# Patient Record
Sex: MALE | Race: WHITE | NOT HISPANIC OR LATINO | Employment: FULL TIME | URBAN - METROPOLITAN AREA
[De-identification: names, ages, dates, MRNs, and addresses within clinical notes are randomized per-mention and may not be internally consistent; named-entity substitution may affect disease eponyms.]

---

## 2024-03-08 ENCOUNTER — HOSPITAL ENCOUNTER (EMERGENCY)
Facility: OTHER | Age: 19
Discharge: HOME | End: 2024-03-08
Attending: EMERGENCY MEDICINE
Payer: COMMERCIAL

## 2024-03-08 VITALS
HEIGHT: 76 IN | TEMPERATURE: 98 F | WEIGHT: 190 LBS | DIASTOLIC BLOOD PRESSURE: 86 MMHG | SYSTOLIC BLOOD PRESSURE: 136 MMHG | RESPIRATION RATE: 18 BRPM | HEART RATE: 70 BPM | BODY MASS INDEX: 23.14 KG/M2 | OXYGEN SATURATION: 100 %

## 2024-03-08 DIAGNOSIS — T78.40XA ALLERGIC REACTION, INITIAL ENCOUNTER: Primary | ICD-10-CM

## 2024-03-08 DIAGNOSIS — R07.0 PAIN IN THROAT: ICD-10-CM

## 2024-03-08 DIAGNOSIS — T78.1XXA ALLERGIC REACTION TO FOOD, INITIAL ENCOUNTER: ICD-10-CM

## 2024-03-08 DIAGNOSIS — R13.10 DYSPHAGIA, UNSPECIFIED TYPE: ICD-10-CM

## 2024-03-08 PROCEDURE — 99284 EMERGENCY DEPT VISIT MOD MDM: CPT

## 2024-03-08 PROCEDURE — 25000003 PHARM REV CODE 250: Performed by: EMERGENCY MEDICINE

## 2024-03-08 PROCEDURE — 63600175 PHARM REV CODE 636 W HCPCS: Performed by: EMERGENCY MEDICINE

## 2024-03-08 RX ORDER — FAMOTIDINE 20 MG/1
20 TABLET, FILM COATED ORAL
Status: COMPLETED | OUTPATIENT
Start: 2024-03-08 | End: 2024-03-08

## 2024-03-08 RX ORDER — SODIUM CHLORIDE 9 MG/ML
1000 INJECTION, SOLUTION INTRAVENOUS
Status: DISCONTINUED | OUTPATIENT
Start: 2024-03-08 | End: 2024-03-08

## 2024-03-08 RX ORDER — FAMOTIDINE 10 MG/ML
20 INJECTION INTRAVENOUS
Status: DISCONTINUED | OUTPATIENT
Start: 2024-03-08 | End: 2024-03-08

## 2024-03-08 RX ORDER — PREDNISOLONE SODIUM PHOSPHATE 15 MG/5ML
60 SOLUTION ORAL
Status: COMPLETED | OUTPATIENT
Start: 2024-03-08 | End: 2024-03-08

## 2024-03-08 RX ORDER — PREDNISOLONE SODIUM PHOSPHATE 15 MG/5ML
60 SOLUTION ORAL DAILY
Qty: 60 ML | Refills: 0 | Status: SHIPPED | OUTPATIENT
Start: 2024-03-08 | End: 2024-03-11

## 2024-03-08 RX ORDER — EPINEPHRINE 0.3 MG/.3ML
1 INJECTION SUBCUTANEOUS
Qty: 2 EACH | Refills: 1 | Status: SHIPPED | OUTPATIENT
Start: 2024-03-08 | End: 2025-03-08

## 2024-03-08 RX ORDER — DIPHENHYDRAMINE HCL 25 MG
50 CAPSULE ORAL EVERY 4 HOURS PRN
Qty: 20 CAPSULE | Refills: 0 | Status: SHIPPED | OUTPATIENT
Start: 2024-03-08

## 2024-03-08 RX ORDER — METHYLPREDNISOLONE SOD SUCC 125 MG
125 VIAL (EA) INJECTION
Status: DISCONTINUED | OUTPATIENT
Start: 2024-03-08 | End: 2024-03-08

## 2024-03-08 RX ORDER — FAMOTIDINE 20 MG/1
20 TABLET, FILM COATED ORAL 2 TIMES DAILY
Qty: 6 TABLET | Refills: 0 | Status: SHIPPED | OUTPATIENT
Start: 2024-03-08 | End: 2024-03-11

## 2024-03-08 RX ADMIN — FAMOTIDINE 20 MG: 20 TABLET, FILM COATED ORAL at 09:03

## 2024-03-08 RX ADMIN — PREDNISOLONE SODIUM PHOSPHATE 60 MG: 15 SOLUTION ORAL at 09:03

## 2024-03-09 NOTE — FIRST PROVIDER EVALUATION
Emergency Department TeleTriage Encounter Note      CHIEF COMPLAINT    Chief Complaint   Patient presents with    Allergic Reaction     States having allergic reaction to ingesting seafood, took Benadryl 50 mg prior to arrival, no visible signs present       VITAL SIGNS   Initial Vitals [03/08/24 2049]   BP Pulse Resp Temp SpO2   (!) 141/75 72 16 98.2 °F (36.8 °C) 100 %      MAP       --            ALLERGIES    Review of patient's allergies indicates:   Allergen Reactions    Shellfish containing products Rash       PROVIDER TRIAGE NOTE  Verbal consent for the teletriage evaluation was given by the patient at the start of the evaluation.  All efforts will be made to maintain patient's privacy during the evaluation.      This is a teletriage evaluation of a 18 y.o. male presenting to the ED with c/o allergic reaction after eating seafood at 7:50 pm.  Took 50 mg Benadryl PTA. States it feels like it is hard to swallow but improving after taking benadryl.  No obvious distress noted in TT.  Limited physical exam via telehealth: The patient is awake, alert, answering questions appropriately and is not in respiratory distress.  As the Teletriage provider, I performed an initial assessment and ordered appropriate labs and imaging studies, if any, to facilitate the patient's care once placed in the ED. Once a room is available, care and a full evaluation will be completed by an alternate ED provider.  Any additional orders and the final disposition will be determined by that provider.  All imaging and labs will not be followed-up by the Teletriage Team, including myself.      Message sent to triage RN to have patient seen by in-person provider    ORDERS  Labs Reviewed   HIV 1 / 2 ANTIBODY   HEPATITIS C ANTIBODY       ED Orders (720h ago, onward)      Start Ordered     Status Ordering Provider    03/08/24 2100 03/08/24 2055  famotidine (PF) injection 20 mg  ED 1 Time         Ordered KADEN PETE    03/08/24 2100 03/08/24  2055  0.9%  NaCl infusion  ED 1 Time         Ordered KADEN PETE    03/08/24 2100 03/08/24 2055  methylPREDNISolone sodium succinate injection 125 mg  ED 1 Time         Ordered KADEN PETE    03/08/24 2056 03/08/24 2055  Saline lock IV  Once         Ordered KADEN PETE    03/08/24 2051 03/08/24 2050  HIV 1/2 Ag/Ab (4th Gen)  STAT         Ordered RONEY ALLEN II    03/08/24 2051 03/08/24 2050  Hepatitis C Antibody  STAT         Ordered RONEY ALLEN II              Virtual Visit Note: The provider triage portion of this emergency department evaluation and documentation was performed via [employer], a HIPAA-compliant telemedicine application, in concert with a tele-presenter in the room. A face to face patient evaluation with one of my colleagues will occur once the patient is placed in an emergency department room.      DISCLAIMER: This note was prepared with [employer] voice recognition transcription software. Garbled syntax, mangled pronouns, and other bizarre constructions may be attributed to that software system.

## 2024-03-09 NOTE — ED TRIAGE NOTES
Known allergy to shellfish. States he was at a wedding party about 45 min PTA and inadvertently at some food containing shellfish. Took Benadryl right away but came here to be checked. States he has experienced symptoms of throat closing and tightness in chest in the past. No complaints at this time. Presents awake, alert.

## 2024-03-09 NOTE — ED PROVIDER NOTES
Encounter Date: 3/8/2024       History     Chief Complaint   Patient presents with    Allergic Reaction     States having allergic reaction to ingesting seafood, took Benadryl 50 mg prior to arrival, no visible signs present     18-year-old male presents via personal transportation to [facility] ER with reported allergic reaction.  Patient states he was in his usual state of health earlier today.  Around 7:45 p.m., he ate a piece of food at a mixer; patient suspects the food contained shellfish to which he is allergic.  He then began having itching and burning sensation inside his mouth and throat, as well as sensation of foreign body in his throat.  This was associated with increased trouble swallowing.  Patient felt nauseated but did not vomit.  No voice changes.  No rash.  No shortness of breath.  Patient took OTC benadryl 50mg around 8:00 p.m.  Patient states pain in throat was worse than with prior reactions but otherwise reaction was similar to past.  Patient has been rx'ed EpiPen in past but has never filled these scripts due to cost.     Patient lives in [place] and is in [place] for the weekend.    Patient has a history of dysphagia ongoing for the last 2 years.  He reports seeing an ENT and having a scope which was normal.  Patient has trouble swallowing pills at baseline.      Review of patient's allergies indicates:   Allergen Reactions    Guaifenesin     Shellfish containing products Rash     No past medical history on file.  No past surgical history on file.  No family history on file.     Review of Systems   Constitutional:  Negative for fever.   HENT:  Positive for sore throat and trouble swallowing.    Eyes:  Negative for photophobia.   Respiratory:  Negative for shortness of breath and wheezing.    Cardiovascular:  Negative for chest pain.   Gastrointestinal:  Positive for nausea. Negative for abdominal pain and vomiting.   Genitourinary:  Negative for dysuria.   Musculoskeletal:  Negative  for gait problem.   Skin:  Negative for rash.   Allergic/Immunologic: Positive for food allergies.   Neurological:  Negative for syncope.       Physical Exam     Initial Vitals [03/08/24 2049]   BP Pulse Resp Temp SpO2   (!) 141/75 72 16 98.2 °F (36.8 °C) 100 %      MAP       --         Physical Exam    Nursing note and vitals reviewed.  Constitutional: He appears well-developed and well-nourished. He is not diaphoretic.   Awake, alert, nontoxic, speaking in complete sentences without slurred or muffled speech. Handling secretions.   HENT:   Head: Normocephalic and atraumatic.   Mouth/Throat: Oropharynx is clear and moist.   Eyes: Conjunctivae and EOM are normal. Pupils are equal, round, and reactive to light.   Neck: Neck supple.   Normal range of motion.  Cardiovascular:  Normal rate, regular rhythm and intact distal pulses.           Pulmonary/Chest: Breath sounds normal. No respiratory distress. He has no wheezes. He has no rhonchi. He has no rales.   Abdominal: Abdomen is soft. There is no abdominal tenderness.   Musculoskeletal:      Cervical back: Normal range of motion and neck supple.      Comments: Ambulatory     Neurological: He is alert and oriented to person, place, and time.   Moving all extremities   Skin: Skin is warm and dry.   Psychiatric: His behavior is normal.         ED Course   Procedures  Labs Reviewed - No data to display       Imaging Results    None          Medications   prednisoLONE 15 mg/5 mL (3 mg/mL) solution 60 mg (60 mg Oral Given 3/8/24 2137)   famotidine tablet 20 mg (20 mg Oral Given 3/8/24 2130)     Medical Decision Making  18-year-old male with itching/burning to lips/mouth/throat associated with difficulty/pain swallowing and nausea.     Ddx includes allergic reaction, anaphylaxis, local tissue trauma, other.    Exam reassuring.  Patient took PO benadryl 50mg PTA.    Patient received PO orapred 60mg (liquid) and PO zantac 20mg (crushed).      I have rx'ed PRN benadryl as well  as daily orapred and zantac and PRN EpiPen.    I have discussed return precautions.  I have encouraged the patient to return to the emergency department if his symptoms recur or worsen, or for new or concerning symptoms.    Risk  OTC drugs.  Prescription drug management.                                      Clinical Impression:  Final diagnoses:  [T78.40XA] Allergic reaction, initial encounter (Primary)  [T78.1XXA] Allergic reaction to food, initial encounter  [R13.10] Dysphagia, unspecified type  [R07.0] Pain in throat          ED Disposition Condition    Discharge Stable          ED Prescriptions       Medication Sig Dispense Start Date End Date Auth. Provider    prednisoLONE (ORAPRED) 15 mg/5 mL (3 mg/mL) solution Take 20 mLs (60 mg total) by mouth once daily. for 3 days 60 mL 3/8/2024 3/11/2024 Juanita Pino MD    famotidine (PEPCID) 20 MG tablet Take 1 tablet (20 mg total) by mouth 2 (two) times daily. for 3 days 6 tablet 3/8/2024 3/11/2024 Juanita Pino MD    diphenhydrAMINE (BENADRYL) 25 mg capsule Take 2 capsules (50 mg total) by mouth every 4 (four) hours as needed for Itching or Allergies. 20 capsule 3/8/2024 -- Juanita Pino MD    EPINEPHrine (EPIPEN) 0.3 mg/0.3 mL AtIn Inject 0.3 mLs (0.3 mg total) into the muscle as needed (severe allergic reaction). 2 each 3/8/2024 3/8/2025 Juanita Pino MD          Follow-up Information    None          Juanita Pino MD  03/08/24 5767